# Patient Record
Sex: FEMALE | Race: WHITE | NOT HISPANIC OR LATINO | URBAN - METROPOLITAN AREA
[De-identification: names, ages, dates, MRNs, and addresses within clinical notes are randomized per-mention and may not be internally consistent; named-entity substitution may affect disease eponyms.]

---
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
## 2023-11-22 NOTE — PROCEDURE: MIPS QUALITY
Detail Level: Detailed
Quality 130: Documentation Of Current Medications In The Medical Record: Current Medications Documented
Quality 226: Preventive Care And Screening: Tobacco Use: Screening And Cessation Intervention: Patient screened for tobacco use and is an ex/non-smoker
Quality 431: Preventive Care And Screening: Unhealthy Alcohol Use - Screening: Patient not identified as an unhealthy alcohol user when screened for unhealthy alcohol use using a systematic screening method
Carac Counseling:  I discussed with the patient the risks of Carac including but not limited to erythema, scaling, itching, weeping, crusting, and pain.

## 2024-12-06 ENCOUNTER — NON-PROVIDER VISIT (OUTPATIENT)
Dept: URGENT CARE | Facility: CLINIC | Age: 19
End: 2024-12-06

## 2024-12-06 DIAGNOSIS — Z11.1 ENCOUNTER FOR PPD TEST: ICD-10-CM

## 2024-12-06 PROCEDURE — 99999 PPD SKIN TEST: CPT | Performed by: NURSE PRACTITIONER

## 2024-12-07 NOTE — PROGRESS NOTES
Ade Gasca is a 19 y.o. female here for a non-provider visit for PPD placement -- Step 1 of 2    Reason for PPD:  work requirement    1. TB evaluation questionnaire completed by patient? Yes      -  If any answers marked yes did you contact a provider prior to placing? Not Indicated  2.  Patient notified to return to clinic for reading on: Sunday 12/8/24 after 1620 or Monday 12/9/24 before 1620  3.  PPD Placement documentation completed on TB evaluation questionnaire? Yes  4.  Location of TB evaluation questionnaire filed: [facility] and media

## 2024-12-09 ENCOUNTER — NON-PROVIDER VISIT (OUTPATIENT)
Dept: URGENT CARE | Facility: CLINIC | Age: 19
End: 2024-12-09

## 2024-12-09 NOTE — PROGRESS NOTES
Ade Gasca is a 19 y.o. female here for a non-provider visit for PPD placement -- Step 2 of 2    Reason for PPD:  work requirement    1. TB evaluation questionnaire completed by patient? Yes      -  If any answers marked yes did you contact a provider prior to placing? Not Indicated  2.  Patient notified to return to clinic for reading on: 12/09/2024 before 1620  3.  PPD Placement documentation completed on TB evaluation questionnaire? YES  4.  Location of TB evaluation questionnaire filed:

## 2025-01-26 ENCOUNTER — OFFICE VISIT (OUTPATIENT)
Dept: URGENT CARE | Facility: CLINIC | Age: 20
End: 2025-01-26
Payer: COMMERCIAL

## 2025-01-26 VITALS
SYSTOLIC BLOOD PRESSURE: 90 MMHG | WEIGHT: 128 LBS | BODY MASS INDEX: 20.57 KG/M2 | HEIGHT: 66 IN | HEART RATE: 97 BPM | OXYGEN SATURATION: 98 % | DIASTOLIC BLOOD PRESSURE: 66 MMHG | TEMPERATURE: 97.5 F | RESPIRATION RATE: 18 BRPM

## 2025-01-26 DIAGNOSIS — J06.9 ACUTE URI: ICD-10-CM

## 2025-01-26 DIAGNOSIS — R05.1 ACUTE COUGH: ICD-10-CM

## 2025-01-26 DIAGNOSIS — J00 ACUTE NASOPHARYNGITIS: ICD-10-CM

## 2025-01-26 PROCEDURE — 3078F DIAST BP <80 MM HG: CPT

## 2025-01-26 PROCEDURE — 99213 OFFICE O/P EST LOW 20 MIN: CPT

## 2025-01-26 PROCEDURE — 3074F SYST BP LT 130 MM HG: CPT

## 2025-01-26 RX ORDER — BUPROPION HYDROCHLORIDE 150 MG/1
150 TABLET ORAL EVERY 24 HOURS
COMMUNITY
Start: 2025-01-07

## 2025-01-26 RX ORDER — BUPROPION HYDROCHLORIDE 100 MG/1
TABLET ORAL
COMMUNITY

## 2025-01-26 NOTE — PROGRESS NOTES
"Chief Complaint   Patient presents with    Cough     Sick with symptoms such as wet cough, congestion, green mucus, fatigue, sore throat, loss of appetite. Patient states that she does work in a         HPI: Patient is a 19 y.o. female complaining of 7 days of illness including: productive cough, nasal congestion, rhinorrhea, sore throat.   Mucus is: green.  Similarly ill exposures: yes.  Treatments tried: many otc cough and cold medications  She  reports that she has never smoked. She has never used smokeless tobacco..     ROS:  No fever, nausea, changes in bowel movements or skin rash.      I reviewed the patient's medications, allergies and medical history:  Current Outpatient Medications   Medication Sig Dispense Refill    buPROPion (WELLBUTRIN XL) 150 MG XL tablet Take 150 mg by mouth every 24 hours.      buPROPion (WELLBUTRIN) 100 MG Tab       lamotrigine (LAMICTAL) 200 MG tablet TAKE 1 TABLET BY MOUTH EVERY DAY 90 Tablet 1    albuterol 108 (90 Base) MCG/ACT Aero Soln inhalation aerosol Inhale 2 Puffs every 6 hours as needed for Shortness of Breath. 8.5 g 0    EPINEPHrine (EPIPEN) 0.3 MG/0.3ML Solution Auto-injector solution for injection  (Patient not taking: Reported on 1/26/2025)       No current facility-administered medications for this visit.     Patient has no known allergies.  History reviewed. No pertinent past medical history.     EXAM:  Temp 36.4 °C (97.5 °F) (Temporal)   Ht 1.676 m (5' 6\")   Wt 58.1 kg (128 lb)   General: Alert, no conversational dyspnea or audible wheeze, non-toxic appearance.  Eyes: PERRL, conjunctiva slightly injected, no eye discharge.  Ears: Normal pinnae,TM's normal bilaterally.  Nares: Patent with thin mucus.  Sinuses: non tender over maxillary / frontal sinuses.  Throat: Erythematous injection without exudate.   Neck: Supple, with no adenopathy.  Lungs: Clear to auscultation bilaterally, no wheeze, crackles or rhonchi.   Heart: Regular rate without murmur.  Skin: " Warm and dry without rash.     ASSESSMENT:   1. Acute cough    2. Acute nasopharyngitis    3. Acute URI    Due to prolonged symptom duration, no in clinic viral testing warranted at this time.     PLAN:  1. Educated patient that majority of upper respiratory infections are viral and do not need antibiotics.  2. Twice daily use of nasal saline rinse or Neti-Pot.  3. OTC anti-pyretics as needed.  4. Follow-up in office or urgent care for worsening symptoms, difficulty breathing, lack of expected recovery, or should new symptoms or problems arise.

## 2025-01-26 NOTE — PATIENT INSTRUCTIONS
-A cough can persist for up to 6 weeks.  -Increase fluids and rest.  -Cool-mist humidifier for nighttime use.   -Practice good hand hygiene.  -Zinc 25 mg has been shown to be effective in reducing the duration of cold symptoms.   -You may use either acetaminophen or ibuprofen over-the-counter every 6-8 hours for pain or fever if that is not contraindicated with your body.    -Saline Nasal Spray and Flonase may be used for congestion. Nasal saline in the nose helps to help break up nasal secretions, and is beneficial for nasal symptoms and throat pain.  -Saline Nasal Rinse with a Neti pot or Chris med rinse can be used multiple times a day. Be sure to use distilled water or boil tap water for 10 mins. Add a saline packet. Be sure the water is not too hot (around your body temp of 98 degrees)  -Decongestants are not recommended as they can have a rebound congestion effect along with many side effects (especially if you have high blood pressure).   -Chloraseptic lozenge, warm tea with honey or  throat spray to help with discomfort.  -Salt water gargles for sore throat several times a day.       Pharyngitis    Pharyngitis is inflammation of the throat (pharynx). It is a very common cause of sore throat. Pharyngitis can be caused by a bacteria, but it is usually caused by a virus. Most cases of pharyngitis get better on their own without treatment.  What are the causes?  This condition may be caused by:  Infection by viruses (viral). Viral pharyngitis spreads easily from person to person (is contagious) through coughing, sneezing, and sharing of personal items or utensils such as cups, forks, spoons, and toothbrushes.  Infection by bacteria (bacterial). Bacterial pharyngitis may be spread by touching the nose or face after coming in contact with the bacteria, or through close contact, such as kissing.  Allergies. Allergies can cause buildup of mucus in the throat (post-nasal drip), leading to inflammation and irritation.  Allergies can also cause blocked nasal passages, forcing breathing through the mouth, which dries and irritates the throat.  What increases the risk?  You are more likely to develop this condition if:  You are 5-24 years old.  You are exposed to crowded environments such as , school, or dormitory living.  You live in a cold climate.  You have a weakened disease-fighting (immune) system.  What are the signs or symptoms?  Symptoms of this condition vary by the cause. Common symptoms of this condition include:  Sore throat.  Fatigue.  Low-grade fever.  Stuffy nose (nasal congestion) and cough.  Headache.  Other symptoms may include:  Glands in the neck (lymph nodes) that are swollen.  Skin rashes.  Plaque-like film on the throat or tonsils. This is often a symptom of bacterial pharyngitis.  Vomiting.  Red, itchy eyes (conjunctivitis).  Loss of appetite.  Joint pain and muscle aches.  Enlarged tonsils.  How is this diagnosed?  This condition may be diagnosed based on your medical history and a physical exam. Your health care provider will ask you questions about your illness and your symptoms.  A swab of your throat may be done to check for bacteria (rapid strep test). Other lab tests may also be done, depending on the suspected cause, but these are rare.  How is this treated?  Many times, treatment is not needed for this condition. Pharyngitis usually gets better in 3-4 days without treatment.  Bacterial pharyngitis may be treated with antibiotic medicines.  Follow these instructions at home:  Medicines  Take over-the-counter and prescription medicines only as told by your health care provider.  If you were prescribed an antibiotic medicine, take it as told by your health care provider. Do not stop taking the antibiotic even if you start to feel better.  Use throat sprays to soothe your throat as told by your health care provider.  Children can get pharyngitis. Do not give your child aspirin because of the  association with Reye's syndrome.  Managing pain  To help with pain, try:  Sipping warm liquids, such as broth, herbal tea, or warm water.  Eating or drinking cold or frozen liquids, such as frozen ice pops.  Gargling with a mixture of salt and water 3-4 times a day or as needed. To make salt water, completely dissolve ½-1 tsp (3-6 g) of salt in 1 cup (237 mL) of warm water.  Sucking on hard candy or throat lozenges.  Putting a cool-mist humidifier in your bedroom at night to moisten the air.  Sitting in the bathroom with the door closed for 5-10 minutes while you run hot water in the shower.    General instructions    Do not use any products that contain nicotine or tobacco. These products include cigarettes, chewing tobacco, and vaping devices, such as e-cigarettes. If you need help quitting, ask your health care provider.  Rest as told by your health care provider.  Drink enough fluid to keep your urine pale yellow.  How is this prevented?  To help prevent becoming infected or spreading infection:  Wash your hands often with soap and water for at least 20 seconds. If soap and water are not available, use hand .  Do not touch your eyes, nose, or mouth with unwashed hands, and wash hands after touching these areas.  Do not share cups or eating utensils.  Avoid close contact with people who are sick.  Contact a health care provider if:  You have large, tender lumps in your neck.  You have a rash.  You cough up green, yellow-brown, or bloody mucus.  Get help right away if:  Your neck becomes stiff.  You drool or are unable to swallow liquids.  You cannot drink or take medicines without vomiting.  You have severe pain that does not go away, even after you take medicine.  You have trouble breathing, and it is not caused by a stuffy nose.  You have new pain and swelling in your joints such as the knees, ankles, wrists, or elbows.  These symptoms may represent a serious problem that is an emergency. Do not wait  to see if the symptoms will go away. Get medical help right away. Call your local emergency services (911 in the [place] Do not drive yourself to the hospital.  Summary  Pharyngitis is redness, pain, and swelling (inflammation) of the throat (pharynx).  While pharyngitis can be caused by a bacteria, the most common causes are viral.  Most cases of pharyngitis get better on their own without treatment.  Bacterial pharyngitis is treated with antibiotic medicines.  This information is not intended to replace advice given to you by your health care provider. Make sure you discuss any questions you have with your health care provider.  Document Revised: 03/16/2022 Document Reviewed: 03/16/2022  [employer] Patient Education © 2023 [employer]    Cough, Adult  Coughing is a reflex that clears your throat and your airways (respiratory system). Coughing helps to heal and protect your lungs. It is normal to cough occasionally, but a cough that happens with other symptoms or lasts a long time may be a sign of a condition that needs treatment. An acute cough may only last 2-3 weeks, while a chronic cough may last 8 or more weeks.  Coughing is commonly caused by:  Infection of the respiratory systemby viruses or bacteria.  Breathing in substances that irritate your lungs.  Allergies.  Asthma.  Mucus that runs down the back of your throat (postnasal drip).  Smoking.  Acid backing up from the stomach into the esophagus (gastroesophageal reflux).  Certain medicines.  Chronic lung problems.  Other medical conditions such as heart failure or a blood clot in the lung (pulmonary embolism).  Follow these instructions at home:  Medicines  Take over-the-counter and prescription medicines only as told by your health care provider.  Talk with your health care provider before you take a cough suppressant medicine.  Lifestyle    Avoid cigarette smoke. Do not use any products that contain nicotine or tobacco, such as cigarettes, e-cigarettes,  and chewing tobacco. If you need help quitting, ask your health care provider.  Drink enough fluid to keep your urine pale yellow.  Avoid caffeine.  Do not drink alcohol if your health care provider tells you not to drink.  General instructions    Pay close attention to changes in your cough. Tell your health care provider about them.  Always cover your mouth when you cough.  Avoid things that make you cough, such as perfume, candles, cleaning products, or campfire or tobacco smoke.  If the air is dry, use a cool mist vaporizer or humidifier in your bedroom or your home to help loosen secretions.  If your cough is worse at night, try to sleep in a semi-upright position.  Rest as needed.  Keep all follow-up visits as told by your health care provider. This is important.  Contact a health care provider if you:  Have new symptoms.  Cough up pus.  Have a cough that does not get better after 2-3 weeks or gets worse.  Cannot control your cough with cough suppressant medicines and you are losing sleep.  Have pain that gets worse or pain that is not helped with medicine.  Have a fever.  Have unexplained weight loss.  Have night sweats.  Get help right away if:  You cough up blood.  You have difficulty breathing.  Your heartbeat is very fast.  These symptoms may represent a serious problem that is an emergency. Do not wait to see if the symptoms will go away. Get medical help right away. Call your local emergency services (911 in the [place] Do not drive yourself to the hospital.  Summary  Coughing is a reflex that clears your throat and your airways. It is normal to cough occasionally, but a cough that happens with other symptoms or lasts a long time may be a sign of a condition that needs treatment.  Take over-the-counter and prescription medicines only as told by your health care provider.  Always cover your mouth when you cough.  Contact a health care provider if you have new symptoms or a cough that does not get better  after 2-3 weeks or gets worse.  This information is not intended to replace advice given to you by your health care provider. Make sure you discuss any questions you have with your health care provider.  Document Revised: 01/06/2020 Document Reviewed: 01/06/2020  [employer] Patient Education © 2023 [employer]

## 2025-02-02 ENCOUNTER — OFFICE VISIT (OUTPATIENT)
Dept: URGENT CARE | Facility: CLINIC | Age: 20
End: 2025-02-02
Payer: COMMERCIAL

## 2025-02-02 VITALS
RESPIRATION RATE: 20 BRPM | OXYGEN SATURATION: 95 % | WEIGHT: 126.8 LBS | HEIGHT: 66 IN | DIASTOLIC BLOOD PRESSURE: 60 MMHG | HEART RATE: 106 BPM | SYSTOLIC BLOOD PRESSURE: 110 MMHG | TEMPERATURE: 97.4 F | BODY MASS INDEX: 20.38 KG/M2

## 2025-02-02 DIAGNOSIS — J01.00 ACUTE NON-RECURRENT MAXILLARY SINUSITIS: ICD-10-CM

## 2025-02-02 PROCEDURE — 3078F DIAST BP <80 MM HG: CPT

## 2025-02-02 PROCEDURE — 99213 OFFICE O/P EST LOW 20 MIN: CPT

## 2025-02-02 PROCEDURE — 3074F SYST BP LT 130 MM HG: CPT

## 2025-02-02 RX ORDER — DEXTROMETHORPHAN HYDROBROMIDE AND PROMETHAZINE HYDROCHLORIDE 15; 6.25 MG/5ML; MG/5ML
5 SYRUP ORAL EVERY 6 HOURS PRN
Qty: 118 ML | Refills: 0 | Status: SHIPPED | OUTPATIENT
Start: 2025-02-02 | End: 2025-02-09

## 2025-02-02 RX ORDER — CETIRIZINE HYDROCHLORIDE 10 MG/1
10 TABLET ORAL DAILY
Qty: 30 TABLET | Refills: 0 | Status: SHIPPED | OUTPATIENT
Start: 2025-02-02

## 2025-02-02 RX ORDER — BENZONATATE 100 MG/1
100 CAPSULE ORAL 3 TIMES DAILY PRN
Qty: 30 CAPSULE | Refills: 0 | Status: SHIPPED | OUTPATIENT
Start: 2025-02-02 | End: 2025-02-12

## 2025-02-02 RX ORDER — FLUTICASONE PROPIONATE 50 MCG
1 SPRAY, SUSPENSION (ML) NASAL DAILY
Qty: 16 G | Refills: 0 | Status: SHIPPED | OUTPATIENT
Start: 2025-02-02

## 2025-02-02 ASSESSMENT — ENCOUNTER SYMPTOMS
SORE THROAT: 1
SPUTUM PRODUCTION: 1
STRIDOR: 0
HEADACHES: 1
VOMITING: 0
CHILLS: 1
DIARRHEA: 0
SINUS PAIN: 1
NAUSEA: 0
WHEEZING: 0
NECK PAIN: 0
EYE DISCHARGE: 0
FEVER: 1
EYE PAIN: 0
EYE REDNESS: 0
COUGH: 1
SHORTNESS OF BREATH: 0
ABDOMINAL PAIN: 0

## 2025-02-03 NOTE — PROGRESS NOTES
Subjective:     Chief Complaint   Patient presents with    Follow-Up     Had a previous visit last week for severe cough and congestion, was told if it didn't lessen I should come back, and my symptoms have not gone away. Symptoms include coughing up phlegm, swollen Francisco's apple, sore throat, cough, wheezing, etc. Same symptoms since 3 weeks ago        HPI:  Ade Gasca is a 19 y.o. female who presents for  3 weeks of nasal congestion, fever, chills, headache, and sinus pressure. Denies associated cough, difficulty breathing, confusion, nausea, vomiting or diarrhea. She has tried OTC cold/sinus medication at home without much improvement. Denies a history of seasonal allergies or sinus infections in the past. No known ill contacts at home. No recent antibiotic usage. Denies recent foreign travel, or domestic travel.. Reports that she works at a  and feels that she has been sick on and off since November. She states this current episode began 1/18/25, she has not improved despite supportive interventions.      ROS:  Review of Systems   Constitutional:  Positive for chills, fever and malaise/fatigue.   HENT:  Positive for congestion, sinus pain and sore throat. Negative for ear discharge, ear pain and hearing loss.    Eyes:  Negative for pain, discharge and redness.   Respiratory:  Positive for cough and sputum production. Negative for shortness of breath, wheezing and stridor.    Cardiovascular:  Negative for chest pain.   Gastrointestinal:  Negative for abdominal pain, diarrhea, nausea and vomiting.   Genitourinary:  Negative for dysuria.   Musculoskeletal:  Negative for neck pain.   Skin:  Negative for rash.   Neurological:  Positive for headaches.        CURRENT MEDICATIONS:  Current Outpatient Medications   Medication Sig Refill Last Dispense    albuterol 108 (90 Base) MCG/ACT Aero Soln inhalation aerosol Inhale 2 Puffs every 6 hours as needed for Shortness of Breath. 0 Unknown (outside pharmacy)  "   buPROPion (WELLBUTRIN XL) 150 MG XL tablet Take 150 mg by mouth every 24 hours.  Unknown (patient-reported)    lamotrigine (LAMICTAL) 200 MG tablet TAKE 1 TABLET BY MOUTH EVERY DAY 1 Unknown (outside pharmacy)       ALLERGIES:   No Known Allergies    PROBLEM LIST:    does not have any pertinent problems on file.    Allergies, Medications, & Tobacco/Substance Use were reconciled by the Medical Assistant and reviewed by myself.     Objective:   Pulse (!) 106   Temp 36.3 °C (97.4 °F) (Temporal)   Resp 20   Ht 1.676 m (5' 6\")   Wt 57.5 kg (126 lb 12.8 oz)   SpO2 95%   BMI 20.47 kg/m²     Physical Exam  Constitutional:       General: She is not in acute distress.     Appearance: She is not ill-appearing or toxic-appearing.   HENT:      Right Ear: Tympanic membrane normal.      Left Ear: Tympanic membrane normal.      Nose: No congestion or rhinorrhea.      Right Sinus: Maxillary sinus tenderness present. No frontal sinus tenderness.      Left Sinus: Maxillary sinus tenderness present. No frontal sinus tenderness.      Mouth/Throat:      Mouth: Mucous membranes are moist.      Pharynx: Oropharynx is clear. Posterior oropharyngeal erythema present. No oropharyngeal exudate.   Eyes:      Pupils: Pupils are equal, round, and reactive to light.   Cardiovascular:      Rate and Rhythm: Normal rate and regular rhythm.      Pulses: Normal pulses.      Heart sounds: Normal heart sounds.   Pulmonary:      Effort: Pulmonary effort is normal. No respiratory distress.      Breath sounds: Normal breath sounds. No wheezing.   Abdominal:      General: Abdomen is flat. Bowel sounds are normal. There is no distension.      Tenderness: There is no abdominal tenderness.   Musculoskeletal:      Cervical back: No tenderness.   Lymphadenopathy:      Cervical: No cervical adenopathy.   Skin:     General: Skin is warm and dry.      Findings: No rash.   Neurological:      Mental Status: She is alert.         Assessment/Plan:   Pt's " history and physical exam consistent with sinusitis. Given reports of double worsening and symptoms persisting for greater than 10 days with minimal to no relief from over-the-counter medications, I do believe it is reasonable to begin antimicrobials at this time. Recommend adjunct therapy and supportive treatment.   Assessment & Plan  Acute non-recurrent maxillary sinusitis  Orders:    benzonatate (TESSALON) 100 MG Cap; Take 1 Capsule by mouth 3 times a day as needed for Cough for up to 10 days.    promethazine-dextromethorphan (PROMETHAZINE-DM) 6.25-15 MG/5ML syrup; Take 5 mL by mouth every 6 hours as needed for Cough for up to 7 days. (caution: may cause sedation)    amoxicillin-clavulanate (AUGMENTIN) 875-125 MG Tab; Take 1 Tablet by mouth 2 times a day for 7 days.    fluticasone (FLONASE) 50 MCG/ACT nasal spray; Administer 1 Spray into affected nostril(S) every day.    cetirizine (ZYRTEC ALLERGY) 10 MG Tab; Take 1 Tablet by mouth every day.  - Encourage pt to take antibiotic as directed, potential side effects of medication discussed. Probiotic use encouraged.   - Encouraged pt to use flonase as directed.   - Educated pt to sleep with HOB elevated, use humidifier at night, rest as much as possible, and increase fluid intake.     Discussed differential diagnosis, management options, risks/benefits, and alternatives to planned treatment. Pt expressed understanding and the treatment plan was agreed upon. Questions were encouraged and answered. Pt encouraged to return to urgent care as needed if new or worsening symptoms or if there is no improvement in condition. Pt educated in red flags and indications to immediately call 911 or present to the Emergency Department. Advised the patient to follow-up with the primary care physician for recheck, reevaluation, and further management.    I personally reviewed prior external notes and test results pertinent to today's visit. I have independently reviewed and interpreted  all diagnostics ordered during this visit.    Please note that this dictation was created using voice recognition software. I have made a reasonable attempt to correct obvious errors, but I expect that there are errors of grammar and possibly content that I did not discover before finalizing the note.    This note was electronically signed by SIERRA Leblanc

## 2025-03-12 ENCOUNTER — OFFICE VISIT (OUTPATIENT)
Dept: URGENT CARE | Facility: CLINIC | Age: 20
End: 2025-03-12
Payer: COMMERCIAL

## 2025-03-12 VITALS
BODY MASS INDEX: 19.99 KG/M2 | HEART RATE: 94 BPM | WEIGHT: 124.4 LBS | TEMPERATURE: 97 F | HEIGHT: 66 IN | SYSTOLIC BLOOD PRESSURE: 110 MMHG | DIASTOLIC BLOOD PRESSURE: 70 MMHG | RESPIRATION RATE: 14 BRPM | OXYGEN SATURATION: 100 %

## 2025-03-12 DIAGNOSIS — J02.9 PHARYNGITIS, UNSPECIFIED ETIOLOGY: ICD-10-CM

## 2025-03-12 LAB
FLUAV RNA SPEC QL NAA+PROBE: NEGATIVE
FLUBV RNA SPEC QL NAA+PROBE: NEGATIVE
RSV RNA SPEC QL NAA+PROBE: NEGATIVE
SARS-COV-2 RNA RESP QL NAA+PROBE: NEGATIVE

## 2025-03-12 PROCEDURE — 0241U POCT CEPHEID COV-2, FLU A/B, RSV - PCR: CPT

## 2025-03-12 PROCEDURE — 3074F SYST BP LT 130 MM HG: CPT

## 2025-03-12 PROCEDURE — 99213 OFFICE O/P EST LOW 20 MIN: CPT

## 2025-03-12 PROCEDURE — 3078F DIAST BP <80 MM HG: CPT

## 2025-03-12 RX ORDER — BUSPIRONE HYDROCHLORIDE 15 MG/1
TABLET ORAL
COMMUNITY
Start: 2025-01-14

## 2025-03-12 ASSESSMENT — ENCOUNTER SYMPTOMS
FEVER: 0
DIZZINESS: 0
WEAKNESS: 0
WHEEZING: 0
CHILLS: 0
PSYCHIATRIC NEGATIVE: 1
ABDOMINAL PAIN: 0
DIAPHORESIS: 0
SPUTUM PRODUCTION: 0
BLURRED VISION: 0
NAUSEA: 0
MYALGIAS: 0
EYE DISCHARGE: 0
COUGH: 1
SHORTNESS OF BREATH: 0
HEADACHES: 0
SINUS PAIN: 0
SORE THROAT: 1
VOMITING: 0
BLOOD IN STOOL: 0
DIARRHEA: 0

## 2025-03-12 NOTE — PROGRESS NOTES
"Subjective:   Ade Gasca is a 19 y.o. female who presents for Cough (Started 3-4 days ago, losing her voice, wet cough, congested, sore throat, swollen lymph nodes, hacking up yellow mucus )      HPI  Patient complains of cough, congestion, sore throat for three days    Negative: muffled voice, drooling, post nasal drip, vision changes, recent travel     Review of Systems   Constitutional:  Negative for chills, diaphoresis, fever and malaise/fatigue.   HENT:  Positive for congestion and sore throat. Negative for ear pain and sinus pain.    Eyes:  Negative for blurred vision and discharge.   Respiratory:  Positive for cough. Negative for sputum production, shortness of breath and wheezing.    Cardiovascular:  Negative for chest pain.   Gastrointestinal:  Negative for abdominal pain, blood in stool, diarrhea, nausea and vomiting.   Genitourinary: Negative.    Musculoskeletal:  Negative for myalgias.   Skin:  Negative for rash.   Neurological:  Negative for dizziness, weakness and headaches.   Endo/Heme/Allergies: Negative.    Psychiatric/Behavioral: Negative.     All other systems reviewed and are negative.      Medical History:  History reviewed. No pertinent past medical history.    Allergies:  No Known Allergies    Social history, surgical history, medications, and current problem list reviewed today in Epic.       Objective:     /70 (BP Location: Left arm, Patient Position: Sitting, BP Cuff Size: Adult)   Pulse 94   Temp 36.1 °C (97 °F) (Temporal)   Resp 14   Ht 1.676 m (5' 6\")   Wt 56.4 kg (124 lb 6.4 oz)   SpO2 100%     Physical Exam  Vitals reviewed.   Constitutional:       General: She is not in acute distress.     Appearance: Normal appearance. She is not ill-appearing, toxic-appearing or diaphoretic.   HENT:      Head: Normocephalic.      Jaw: There is normal jaw occlusion.      Right Ear: Tympanic membrane, ear canal and external ear normal.      Left Ear: Tympanic membrane, ear canal " and external ear normal.      Nose: Congestion and rhinorrhea present.      Right Sinus: No maxillary sinus tenderness or frontal sinus tenderness.      Left Sinus: No maxillary sinus tenderness or frontal sinus tenderness.      Mouth/Throat:      Lips: Pink.      Mouth: Mucous membranes are moist.      Tongue: Tongue does not deviate from midline.      Pharynx: Oropharynx is clear. Uvula midline. No oropharyngeal exudate, posterior oropharyngeal erythema or uvula swelling.   Eyes:      Extraocular Movements: Extraocular movements intact.      Conjunctiva/sclera: Conjunctivae normal.      Pupils: Pupils are equal, round, and reactive to light.   Cardiovascular:      Rate and Rhythm: Normal rate and regular rhythm.      Pulses: Normal pulses.      Heart sounds: Normal heart sounds.   Pulmonary:      Effort: Pulmonary effort is normal.      Breath sounds: Normal breath sounds.   Abdominal:      General: Abdomen is flat.      Palpations: Abdomen is soft.      Tenderness: There is no abdominal tenderness.   Musculoskeletal:         General: Normal range of motion.      Cervical back: Normal range of motion and neck supple.   Lymphadenopathy:      Cervical: No cervical adenopathy.   Skin:     General: Skin is warm.      Capillary Refill: Capillary refill takes less than 2 seconds.   Neurological:      General: No focal deficit present.      Mental Status: She is alert and oriented to person, place, and time.   Psychiatric:         Mood and Affect: Mood normal.         Behavior: Behavior normal.         Assessment/Plan:       Diagnosis and associated orders:     1. Pharyngitis, unspecified etiology  POCT CEPHEID COV-2, FLU A/B, RSV - PCR           Comments/MDM:   I personally reviewed prior external notes and test results pertinent to today's visit as well as additional imaging and testing completed in clinic today.     Very pleasant 19-year-old afebrile, hemodynamically stable, generally well-appearing female, presenting  with complaints of cough, congestion, and sore throat.  Normal pulmonary exam, no concern for pneumonia or bronchitis.  Normal ENT exam outside of nasal congestion and rhinorrhea, no concern for acute otitis media, strep pharyngitis, or bacterial sinus infection.   In clinic viral testing was negative.   They were encouraged to increase fluids and rest, cool-mist humidifier, saline nasal rinse with distilled water, cough/throat drops, salt water gargles, tylenol or ibuprofen for fever and/or bodyaches.  At this time I do not believe antibiotics would improve patient's symptoms.  They were encouraged to follow-up with the clinic in 48 hours for re-evaluation as needed.  They were given strict instructions to follow up with the emergency room if they develop worsening symptoms and they verbalized understanding.  Patient is clinically stable at today's acute urgent care visit. Vital signs are normal and reassuring.  No acute distress noted. Appropriate for outpatient management at this time. No red flag warnings noted.  Patient given strict instructions to follow up with emergency room if they develop any red flag warnings which were discussed in depth.  They verbalized understanding. Differential diagnosis, natural history, supportive care, and indications for immediate follow-up discussed. All questions answered. They agree with the plan of care.      Please note that this dictation was created using voice recognition software. I have made every reasonable attempt to correct obvious errors, but I expect that there are errors of grammar and possibly content that I did not discover before finalizing the note.

## 2025-03-12 NOTE — PATIENT INSTRUCTIONS
-A cough can persist for up to 6 weeks.  -Increase fluids.  -Eat whole foods that are high in vitamins and minerals to aid in healing.  -REST! REST! Rest! Let your body rest so it can heal.   -Cool-mist humidifier for nighttime use.   -Practice good hand hygiene.  -You may use either acetaminophen or ibuprofen over-the-counter every 6-8 hours for pain or fever if that is not contraindicated with your body.   -Chloraseptic lozenge, warm tea with honey or throat spray to help with discomfort.   -Zinc 25 mg has been shown to be effective in reducing the duration of cold symptoms.  -Saline Nasal Spray and Flonase may be used for congestion. Nasal saline in the nose helps to help break up nasal secretions and is beneficial for nasal symptoms and throat pain.  -Saline Nasal Rinse with a Neti pot or Chris med rinse can be used multiple times a day. Be sure to use distilled water or boil tap water for 10 mins. Add a saline packet. Be sure the water is not too hot (around your body temp of 98 degrees)  -Decongestants are not recommended as they can have a rebound congestion effect along with many side effects (especially if you have high blood pressure).   -Salt water gargles for sore throat several times a day.

## 2025-03-12 NOTE — LETTER
March 12, 2025         Patient: Ade Gasca   YOB: 2005   Date of Visit: 3/12/2025           To Whom it May Concern:    Ade Gasca was seen in my clinic on 3/12/2025. She may return to school and work on 3/13/25.    If you have any questions or concerns, please don't hesitate to call.        Sincerely,           SIDDHARTHA Christianson.  Electronically Signed

## 2025-05-29 ENCOUNTER — OFFICE VISIT (OUTPATIENT)
Dept: URGENT CARE | Facility: CLINIC | Age: 20
End: 2025-05-29
Payer: COMMERCIAL

## 2025-05-29 VITALS
RESPIRATION RATE: 15 BRPM | HEART RATE: 119 BPM | SYSTOLIC BLOOD PRESSURE: 104 MMHG | DIASTOLIC BLOOD PRESSURE: 76 MMHG | BODY MASS INDEX: 20.89 KG/M2 | OXYGEN SATURATION: 96 % | HEIGHT: 66 IN | WEIGHT: 130 LBS | TEMPERATURE: 98.5 F

## 2025-05-29 DIAGNOSIS — R11.2 NAUSEA AND VOMITING, UNSPECIFIED VOMITING TYPE: Primary | ICD-10-CM

## 2025-05-29 DIAGNOSIS — R19.7 DIARRHEA OF PRESUMED INFECTIOUS ORIGIN: ICD-10-CM

## 2025-05-29 RX ORDER — ONDANSETRON 2 MG/ML
4 INJECTION INTRAMUSCULAR; INTRAVENOUS ONCE
Status: COMPLETED | OUTPATIENT
Start: 2025-05-29 | End: 2025-05-29

## 2025-05-29 RX ORDER — ONDANSETRON 4 MG/1
4 TABLET, ORALLY DISINTEGRATING ORAL EVERY 8 HOURS PRN
Qty: 10 TABLET | Refills: 0 | Status: SHIPPED | OUTPATIENT
Start: 2025-05-29

## 2025-05-29 RX ADMIN — ONDANSETRON 4 MG: 2 INJECTION INTRAMUSCULAR; INTRAVENOUS at 20:10

## 2025-05-29 ASSESSMENT — ENCOUNTER SYMPTOMS
ABDOMINAL PAIN: 0
FEVER: 0
CHANGE IN BOWEL HABIT: 1
SORE THROAT: 0
FATIGUE: 1
CONSTIPATION: 0
ROS GI COMMENTS: 1
VOMITING: 1
DIARRHEA: 1
CHILLS: 1
COUGH: 0
NAUSEA: 1
BACK PAIN: 0

## 2025-05-29 NOTE — LETTER
May 29, 2025         Patient: Ade Gasca   YOB: 2005   Date of Visit: 5/29/2025           To Whom it May Concern:    Ade Gasca was seen in my clinic on 5/29/2025. She may return to work on 6/2/25.    If you have any questions or concerns, please don't hesitate to call.        Sincerely,           SIDDHATRHA Tomas.  Electronically Signed

## 2025-05-30 NOTE — PROGRESS NOTES
"Subjective:   Ade Gasca is a 19 y.o. female who presents for Emesis (Diarrhea, chills x2 days)      GI Problem  This is a new problem. Episode onset: 2 days. The problem occurs constantly. The problem has been unchanged. Associated symptoms include a change in bowel habit, chills, fatigue, nausea and vomiting. Pertinent negatives include no abdominal pain, congestion, coughing, fever or sore throat. She has tried drinking for the symptoms. The treatment provided mild relief.       Review of Systems   Constitutional:  Positive for chills, fatigue and malaise/fatigue. Negative for fever.   HENT:  Negative for congestion and sore throat.    Respiratory:  Negative for cough.    Gastrointestinal:  Positive for change in bowel habit, diarrhea, nausea and vomiting. Negative for abdominal pain and constipation.        1   Genitourinary:  Negative for dysuria.   Musculoskeletal:  Negative for back pain.       Medications:    albuterol Aers  buPROPion  busPIRone  cetirizine Tabs  fluticasone  lamotrigine  ondansetron  ondansetron Tbdp    Allergies: Patient has no known allergies.    Problem List: Ade Gasca does not have any pertinent problems on file.    Surgical History:  No past surgical history on file.    Past Social Hx: Ade Gasca  reports that she has quit smoking. Her smoking use included cigarettes. She has never used smokeless tobacco. She reports current alcohol use. She reports current drug use. Drug: Marijuana.     Past Family Hx:  Ade Gasca family history includes Depression in her father and mother; Drug abuse in her father, maternal grandfather, and mother; Heart Disease in her maternal grandfather; Hypertension in her mother.     Problem list, medications, and allergies reviewed by myself today in Epic.     Objective:     /76   Pulse (!) 119   Temp 36.9 °C (98.5 °F) (Temporal)   Resp 15   Ht 1.676 m (5' 6\")   Wt 59 kg (130 lb)   SpO2 96%   BMI 20.98 kg/m² "     Physical Exam  Vitals and nursing note reviewed.   Constitutional:       General: She is not in acute distress.     Appearance: She is well-developed.   HENT:      Head: Normocephalic and atraumatic.      Right Ear: External ear normal.      Left Ear: External ear normal.      Nose: Nose normal.      Mouth/Throat:      Mouth: Mucous membranes are moist.   Eyes:      Conjunctiva/sclera: Conjunctivae normal.   Cardiovascular:      Rate and Rhythm: Tachycardia present.      Heart sounds: Normal heart sounds, S1 normal and S2 normal.   Pulmonary:      Effort: Pulmonary effort is normal. No respiratory distress.      Breath sounds: Normal breath sounds.   Abdominal:      General: There is no distension.      Tenderness: There is no abdominal tenderness. There is no guarding or rebound. Negative signs include Chun's sign, Rovsing's sign, McBurney's sign, psoas sign and obturator sign.   Musculoskeletal:         General: Normal range of motion.   Skin:     General: Skin is warm and dry.   Neurological:      General: No focal deficit present.      Mental Status: She is alert and oriented to person, place, and time. Mental status is at baseline.      Gait: Gait (gait at baseline) normal.   Psychiatric:         Judgment: Judgment normal.         Assessment/Plan:     Diagnosis and associated orders:     1. Nausea and vomiting, unspecified vomiting type  ondansetron (Zofran) syringe/vial injection 4 mg    ondansetron (ZOFRAN ODT) 4 MG TABLET DISPERSIBLE      2. Diarrhea of presumed infectious origin             Comments/MDM:     I personally reviewed prior external notes and prior test results pertinent to today's visit.  Patient having systemic symptoms, is tachycardic, nausea and vomiting will treat with oral antiemetic IM Zofran administered in clinic.  Discussed likely viral etiology encouraged increased fluids and elected  Discussed management options, risks and benefits, and alternatives to treatment plan agreed  upon.   Red flags discussed and indications to immediately call 911 or present to the Emergency Department.   Supportive care, differential diagnoses, and indications for immediate follow-up discussed with patient.    Patient expresses understanding and agrees to plan. Patient denies any other questions or concerns.              Differential diagnosis, natural history, supportive care, and indications for immediate follow-up discussed.    Advised the patient to follow-up with the primary care physician for recheck, reevaluation, and consideration of further management.    Please note that this dictation was created using voice recognition software. I have made a reasonable attempt to correct obvious errors, but I expect that there are errors of grammar and possibly content that I did not discover before finalizing the note.    This note was electronically signed by Shailesh ESQUIVEL.